# Patient Record
(demographics unavailable — no encounter records)

---

## 2025-01-23 NOTE — ASSESSMENT
[FreeTextEntry1] : 86-year-old woman with arthritic changes about her lumbar spine which are causing her pain.  Recommend physical therapy continued use of NSAIDs with NSAID precautions and referral to pain management for selective facet injections.  Pain persist she can follow-up with the spine service.  Copy of my note should be CCed to her primary care doctor.  All questions answered to her satisfaction she agrees with the plan.

## 2025-01-23 NOTE — HISTORY OF PRESENT ILLNESS
[de-identified] : 86-year-old woman referred to my office for evaluation of right low back pain.  Because of severe buttock pain with radiation in October she was seen in the emergency room at [facility] where a CAT scan was obtained.  There was no acute fracture seen at that time.  Patient has had persistent pain in her lower back and buttocks.  There is not significant radiation down to her toes although it is unclear if she may have had some of the symptoms previously.  She walks with a cane.  She is limited to a couple blocks of walking.  Patient has been on an over-the-counter combination acetaminophen/ibuprofen how often do you take once a day as needed.  She is also on gabapentin prescribed by her primary medical doctor.  Past medical history: Hyperlipidemia Hypertension GERD  Medications: Atorvastatin Metoprolol Nexium Meloxicam as needed  NKDA  Social: Walks with a cane, lives with family, no cigarette use no alcohol no drug use

## 2025-01-23 NOTE — IMAGING
[de-identified] : Pleasant older woman walks with a unsteady gait.  Physical examination: Lumbar spine: Some mild paraspinal spasming over the lower lumbar spine.  No midline or bony tenderness.  Negative straight leg raise test.  Negative NINO maneuver.  Motor strength is 5 out of 5 in iliopsoas quadricep hamstring tibialis anterior gastrosoleus muscle function.  Testing of these areas elicits some knee pain but there is no evidence of weakness.  She has normal sensation of her lower extremities with no clear evidence of atrophy.  Right hip (AP, lateral): No pain with internal or external rotation of her hip try to full extension 9 degrees of flexion.  No lymph nodes inguinal crease although habitus obscures exam somewhat.  No tenderness of the trochanteric bursa.   Radiographs: Right hip (AP pelvis, lateral): Advanced arthritic changes noted in lower lumbar spine.  Hip joint space of region well-preserved.  No marginal osteophytes about her hip joints.  CAT scan of the patient's chest and abdomen reviewed from [facility] from October 28, 2024 demonstrates advanced arthritic changes noted about the lumbar spine with degenerative disc disease and facet arthritis.

## 2025-01-28 NOTE — PHYSICAL EXAM
[de-identified] : Lumbar spine exam: Inspection: erythema (-) ecchymosis (-)    Palpation:  Midline lumbar tenderness: (-) paraspinal tenderness: L (-) ; R (-)  SI joint tenderness: L (-) ; R (-)  GTB tenderness: L (-);  R (-)      Special Tests:  SLR: R (+) ; L (-)  Facet loading: R (-) ; L (-)  NINO test: R (-) ; L (-) Gaenslen's: R (-) ; L (-)  Compression test: R (-) ; L (-)      Gait:  non- antalgic gait

## 2025-01-28 NOTE — HISTORY OF PRESENT ILLNESS
[FreeTextEntry1] : HISTORY OF PRESENT ILLNESS: Mrs. Oh is an 86-year-old female (CKD) complaining of low back/buttock and right hip pain. The pain started after no specific injury or event.  The patient has had this pain for months.  Patient describes the pain as moderate to severe.  During the last month the pain has been nearly constant with symptoms worsening in no typical pattern. Pain described as right-sided buttock pain that is 8/10 worse with transitional movements, standing and sitting for periods of time that is sharp, dull, achy in nature and radiates down the buttock for more than 6 weeks. NSAIDs, HEP/PT has not significantly reduced the pain.   ACTIVITIES: Patient uses a cane to ambulate at this time.  Patient has difficulty performing household chores, going to work, doing yardwork or shopping, participating in recreational activities & exercise at this time.  Prior Pain Medications: Motrin, Tylenol, gabapentin.

## 2025-01-28 NOTE — DATA REVIEWED
[FreeTextEntry1] : Right hip (AP pelvis, lateral): Advanced arthritic changes noted in lower lumbar spine. Hip joint space of region well-preserved. No marginal osteophytes about her hip joints.  CAT scan of the patient's chest and abdomen reviewed from [facility] from October 28, 2024 demonstrates advanced arthritic changes noted about the lumbar spine with degenerative disc disease and facet arthritis.

## 2025-01-28 NOTE — DATA REVIEWED
[FreeTextEntry1] : Right hip (AP pelvis, lateral): Advanced arthritic changes noted in lower lumbar spine. Hip joint space of region well-preserved. No marginal osteophytes about her hip joints.  CAT scan of the patient's chest and abdomen reviewed from [facility] from October 28, 2024 demonstrates advanced arthritic changes noted about the lumbar spine with degenerative disc disease and facet arthritis.

## 2025-01-28 NOTE — DISCUSSION/SUMMARY
[de-identified] : A discussion regarding available pain management treatment options occurred with the patient. These included interventional, rehabilitative, pharmacological, and alternative modalities. We will proceed with the following:   Imaging:  - MRI lumbar spine w/o contrast was ordered to evaluate for anatomic changes of the lumbar discs, nerves, and surrounding tissue that will help provide information to accurately diagnose the patient's cause of pain and therefore treat said pain generator in the most effective way possible - whether that be specific physical therapy recommendations, medications, and/or interventional therapies.   Interventional treatment options: - Potential treatment options such as further conservative therapy, injections, and surgery were briefly discussed.    Rehabilitative options: -Participation in active HEP was discussed and printed.  Patient given specific exercises to do including side plank, Quadruped arm/leg raise, Extension exercise, and Glut Bridges.   Medication based treatment options: - ordered a Medrol Dose Pack 4mg, use as directed for a duration of 6 days.   Complementary treatment options: - Patient was advised to stay away from any heavy lifting. If needed, she was advised to squat and not bend forward. - Initiate physician directed activity and lifestyle modifications.  Follow up in 4-6 weeks for reassessment.  I, Neeraj Parmar, attest that this documentation has been prepared under the direction and in the presence of Provider Isra Seaman, DO The documentation recorded by the scribe, in my presence, accurately reflects the service I personally performed, and the decisions made by me with my edits as appropriate.   Best Regards, Isra Seaman D.O.

## 2025-01-28 NOTE — DISCUSSION/SUMMARY
[de-identified] : A discussion regarding available pain management treatment options occurred with the patient. These included interventional, rehabilitative, pharmacological, and alternative modalities. We will proceed with the following:   Imaging:  - MRI lumbar spine w/o contrast was ordered to evaluate for anatomic changes of the lumbar discs, nerves, and surrounding tissue that will help provide information to accurately diagnose the patient's cause of pain and therefore treat said pain generator in the most effective way possible - whether that be specific physical therapy recommendations, medications, and/or interventional therapies.   Interventional treatment options: - Potential treatment options such as further conservative therapy, injections, and surgery were briefly discussed.    Rehabilitative options: -Participation in active HEP was discussed and printed.  Patient given specific exercises to do including side plank, Quadruped arm/leg raise, Extension exercise, and Glut Bridges.   Medication based treatment options: - ordered a Medrol Dose Pack 4mg, use as directed for a duration of 6 days.   Complementary treatment options: - Patient was advised to stay away from any heavy lifting. If needed, she was advised to squat and not bend forward. - Initiate physician directed activity and lifestyle modifications.  Follow up in 4-6 weeks for reassessment.  I, Neeraj Parmar, attest that this documentation has been prepared under the direction and in the presence of Provider Isra Seaman, DO The documentation recorded by the scribe, in my presence, accurately reflects the service I personally performed, and the decisions made by me with my edits as appropriate.   Best Regards, Isra Seaman D.O.

## 2025-01-28 NOTE — PHYSICAL EXAM
[de-identified] : Lumbar spine exam: Inspection: erythema (-) ecchymosis (-)    Palpation:  Midline lumbar tenderness: (-) paraspinal tenderness: L (-) ; R (-)  SI joint tenderness: L (-) ; R (-)  GTB tenderness: L (-);  R (-)      Special Tests:  SLR: R (+) ; L (-)  Facet loading: R (-) ; L (-)  NINO test: R (-) ; L (-) Gaenslen's: R (-) ; L (-)  Compression test: R (-) ; L (-)      Gait:  non- antalgic gait

## 2025-03-07 NOTE — PHYSICAL EXAM
[No Acute Distress] : no acute distress [Well Developed] : well developed [Normal Voice/Communication] : normal voice/communication [Normal Sclera/Conjunctiva] : normal sclera/conjunctiva [Normal Outer Ear/Nose] : the outer ears and nose were normal in appearance [No Respiratory Distress] : no respiratory distress  [Normal Affect] : the affect was normal [Alert and Oriented x3] : oriented to person, place, and time [Normal Mood] : the mood was normal [de-identified] : trace edema b/l ankles

## 2025-03-07 NOTE — HISTORY OF PRESENT ILLNESS
[Home] : at home, [unfilled] , at the time of the visit. [Other Location: e.g. Home (Enter Location, City,State)___] : at [unfilled] [Telehealth (audio & video)] : This visit was provided via telehealth using real-time 2-way audio visual technology. [Verbal consent obtained from patient] : the patient, [unfilled] [Family Member] : family member [FreeTextEntry1] : F/U hospital discharge for influenza/pna/bacteremia/analisa.  [de-identified] : Patient is a 86 year old female enrolled in the [facility] TCM program s/p a recent discharge from [facility] 2/16-2/20 for influenza. PMH HTN, HLD, restless leg syndrome, overactive bladder. Patient was treated and sent home without HCS. During visit patient alert in nad, denies cp, sob, fever, chills, n/v/d, cough. Reports ankle edema and lower back soreness. F/U appointments with pcp completed; will f/u in 3 weeks. Patient was contacted by NP from TCM and medication reconciliation was done.  Copied from [facility]: Hospital Course: 86 year old female PMHx of HTN, HLD, restless leg syndrome, overactive bladder presenting to the ED for generalized weakness and bilateral leg weakness since this morning. Patient says symptoms started suddenly today and states she has pain to her right thigh while she was trying to stand up. Patient with weakness to both legs, difficulty standing and walking so family called ambulance. Uses a walker/cane for assistance as needed. No new increased lower extremity swelling. Patient reports having a cough now for the past month, completed course of azithromycin. Pt reports no relief of cough with benzonatate. Patient otherwise denies fever, chills, headache, dizziness, chest pain, shortness of breath, abdominal pain, nausea, vomiting, diarrhea, recent falls, numbness, tingling.  # Influenza Type A  # Mulfifocal PNA  # Bacteremia (Strep pneumo + / Staph Epi-likely a contaminant)  # COPD/Asthma in exacerbation , mild  - on room air  - CXR negative  - CT CHEST : Focal left upper lobe consolidation with additional scattered right upper  lobe groundglass opacities. Additional bilateral lower lobe consolidative  and groundglass opacities. Findings can be seen with pneumonia. Recommend  radiographic follow-up - procalcitonin : 2.04  - TTE no veg - cw home inhalers ; duonebs , prednisone 40 mg x5days  - repeat blood Cx neg - ID following : was on Rocephin 2G, can be DCed on PO Augmentin to complete 14 days total #LILLY - resolved  possibly pre-renal  #Lactic acidosis - resolved  - Baseline Cr ~ 1.4  - sp IVF  # Generalized weakness  # Right thigh pain reported on admission - now resolved  - XR pelvis/ hip negative  - PT consult - rehab vs home pt. Family was offered home PT, declined, will opt for possible outpatient PT via PMD  #Elevated troponin possibly demand ischemia in setting of dehydration/ lilly  - denies chest pain  - trop 50 > 48 ; ekg nsr  - TTE 2/17 - EF 60 % / borderline pHTN  #H/O HTN - cw metoprolol succinate ER 50 mg, amlodipine 5 mg qd  #H/O Restless leg syndrome -c/w ropinirole and gabapentin

## 2025-03-07 NOTE — PLAN
[FreeTextEntry1] : Influenza: resolved Bacteremia: f/u pcp for 3 week f/u. abx completed. denies symptoms.

## 2025-03-07 NOTE — ASSESSMENT
[FreeTextEntry1] : Patient is a 86 year old female enrolled in the [facility] TCM program s/p a recent discharge from [facility] 2/16-2/20 for influenza. PMH HTN, HLD, restless leg syndrome, overactive bladder. Patient was treated and sent home without HCS. During visit patient alert in nad, denies cp, sob, fever, chills, n/v/d, cough. Reports ankle edema and lower back soreness. F/U appointments with pcp completed; will f/u in 3 weeks. Patient was contacted by NP from TCM and medication reconciliation was done.